# Patient Record
Sex: FEMALE | Race: WHITE | NOT HISPANIC OR LATINO | Employment: OTHER | URBAN - METROPOLITAN AREA
[De-identification: names, ages, dates, MRNs, and addresses within clinical notes are randomized per-mention and may not be internally consistent; named-entity substitution may affect disease eponyms.]

---

## 2017-05-11 ENCOUNTER — OFFICE VISIT (OUTPATIENT)
Dept: OPHTHALMOLOGY | Facility: CLINIC | Age: 82
End: 2017-05-11
Payer: MEDICARE

## 2017-05-11 DIAGNOSIS — Z96.1 LENS REPLACED BY OTHER MEANS: ICD-10-CM

## 2017-05-11 DIAGNOSIS — H04.129 DRY EYE: Primary | ICD-10-CM

## 2017-05-11 PROCEDURE — 99999 PR PBB SHADOW E&M-EST. PATIENT-LVL II: CPT | Mod: PBBFAC,,, | Performed by: OPHTHALMOLOGY

## 2017-05-11 PROCEDURE — 92014 COMPRE OPH EXAM EST PT 1/>: CPT | Mod: S$GLB,,, | Performed by: OPHTHALMOLOGY

## 2017-05-11 NOTE — PROGRESS NOTES
SUBJECTIVE:   Rebeka Pascual is a 88 y.o. female   Uncorrected distance visual acuity was 20/30 in the right eye and 20/30 -1 in the left eye.   Chief Complaint   Patient presents with    Eye Exam     2 year RTC, no drops        HPI:  HPI     Eye Exam    Additional comments: 2 year RTC, no drops           Comments   Pt states no problems with her vision since her last visit. Not using any   drops. No ocular pain or irritation. Using readers for small print. Still   seeing well at a distance with no correction.    1. PCIOL OD 4/10  2. PCIOL OS 3/10  3. Ptosis OU  4. Dry Eyes  5. Blepharitis  6. Mild Epiretinal Gliosis OS           Last edited by Jack Sawyer on 5/11/2017  8:52 AM. (History)        Assessment /Plan :  1. Dry eye Stable pt states much better   2. Lens replaced by other means Stable       RTC 1 Year

## 2017-08-29 ENCOUNTER — PATIENT OUTREACH (OUTPATIENT)
Dept: ADMINISTRATIVE | Facility: HOSPITAL | Age: 82
End: 2017-08-29

## 2018-01-02 ENCOUNTER — TELEPHONE (OUTPATIENT)
Dept: RHEUMATOLOGY | Facility: CLINIC | Age: 83
End: 2018-01-02

## 2018-01-02 NOTE — TELEPHONE ENCOUNTER
Spoke with patient.  She states she is having neck pain.  Advised her to get appt with ortho first and if needed, they would refer her to [facility]

## 2018-01-20 ENCOUNTER — HOSPITAL ENCOUNTER (EMERGENCY)
Facility: HOSPITAL | Age: 83
Discharge: HOME | End: 2018-01-20
Attending: EMERGENCY MEDICINE
Payer: MEDICARE

## 2018-01-20 VITALS
OXYGEN SATURATION: 97 % | RESPIRATION RATE: 18 BRPM | HEART RATE: 89 BPM | TEMPERATURE: 98 F | SYSTOLIC BLOOD PRESSURE: 115 MMHG | WEIGHT: 115 LBS | DIASTOLIC BLOOD PRESSURE: 56 MMHG | HEIGHT: 60 IN | BODY MASS INDEX: 22.58 KG/M2

## 2018-01-20 DIAGNOSIS — J06.9 UPPER RESPIRATORY TRACT INFECTION, UNSPECIFIED TYPE: Primary | ICD-10-CM

## 2018-01-20 DIAGNOSIS — R05.9 COUGH: ICD-10-CM

## 2018-01-20 DIAGNOSIS — S39.011A STRAIN OF ABDOMINAL MUSCLE, INITIAL ENCOUNTER: ICD-10-CM

## 2018-01-20 LAB
ALBUMIN SERPL BCP-MCNC: 3.5 G/DL
ALP SERPL-CCNC: 71 U/L
ALT SERPL W/O P-5'-P-CCNC: 17 U/L
ANION GAP SERPL CALC-SCNC: 11 MMOL/L
AST SERPL-CCNC: 19 U/L
BASOPHILS # BLD AUTO: 0.03 K/UL
BASOPHILS NFR BLD: 0.5 %
BILIRUB SERPL-MCNC: 0.6 MG/DL
BILIRUB UR QL STRIP: NEGATIVE
BUN SERPL-MCNC: 21 MG/DL
CALCIUM SERPL-MCNC: 8.7 MG/DL
CHLORIDE SERPL-SCNC: 106 MMOL/L
CLARITY UR: CLEAR
CO2 SERPL-SCNC: 22 MMOL/L
COLOR UR: YELLOW
CREAT SERPL-MCNC: 0.7 MG/DL
DIFFERENTIAL METHOD: ABNORMAL
EOSINOPHIL # BLD AUTO: 0.1 K/UL
EOSINOPHIL NFR BLD: 0.9 %
ERYTHROCYTE [DISTWIDTH] IN BLOOD BY AUTOMATED COUNT: 13.8 %
EST. GFR  (AFRICAN AMERICAN): >60 ML/MIN/1.73 M^2
EST. GFR  (NON AFRICAN AMERICAN): >60 ML/MIN/1.73 M^2
FLUAV AG SPEC QL IA: NEGATIVE
FLUBV AG SPEC QL IA: NEGATIVE
GLUCOSE SERPL-MCNC: 99 MG/DL
GLUCOSE UR QL STRIP: NEGATIVE
HCT VFR BLD AUTO: 35.3 %
HGB BLD-MCNC: 11.7 G/DL
HGB UR QL STRIP: ABNORMAL
KETONES UR QL STRIP: ABNORMAL
LEUKOCYTE ESTERASE UR QL STRIP: NEGATIVE
LIPASE SERPL-CCNC: 22 U/L
LYMPHOCYTES # BLD AUTO: 0.6 K/UL
LYMPHOCYTES NFR BLD: 9.2 %
MCH RBC QN AUTO: 29.8 PG
MCHC RBC AUTO-ENTMCNC: 33.1 G/DL
MCV RBC AUTO: 90 FL
MONOCYTES # BLD AUTO: 0.7 K/UL
MONOCYTES NFR BLD: 10.8 %
NEUTROPHILS # BLD AUTO: 5 K/UL
NEUTROPHILS NFR BLD: 78.6 %
NITRITE UR QL STRIP: NEGATIVE
PH UR STRIP: 6 [PH] (ref 5–8)
PLATELET # BLD AUTO: 228 K/UL
PMV BLD AUTO: 9.5 FL
POTASSIUM SERPL-SCNC: 3.6 MMOL/L
PROT SERPL-MCNC: 6.4 G/DL
PROT UR QL STRIP: ABNORMAL
RBC # BLD AUTO: 3.92 M/UL
SODIUM SERPL-SCNC: 139 MMOL/L
SP GR UR STRIP: >=1.03 (ref 1–1.03)
SPECIMEN SOURCE: NORMAL
URN SPEC COLLECT METH UR: ABNORMAL
UROBILINOGEN UR STRIP-ACNC: NEGATIVE EU/DL
WBC # BLD AUTO: 6.39 K/UL

## 2018-01-20 PROCEDURE — 81003 URINALYSIS AUTO W/O SCOPE: CPT

## 2018-01-20 PROCEDURE — 99284 EMERGENCY DEPT VISIT MOD MDM: CPT | Mod: 25

## 2018-01-20 PROCEDURE — 87400 INFLUENZA A/B EACH AG IA: CPT | Mod: 59

## 2018-01-20 PROCEDURE — 25000003 PHARM REV CODE 250: Performed by: EMERGENCY MEDICINE

## 2018-01-20 PROCEDURE — 63600175 PHARM REV CODE 636 W HCPCS: Performed by: EMERGENCY MEDICINE

## 2018-01-20 PROCEDURE — 85025 COMPLETE CBC W/AUTO DIFF WBC: CPT

## 2018-01-20 PROCEDURE — 80053 COMPREHEN METABOLIC PANEL: CPT

## 2018-01-20 PROCEDURE — 83690 ASSAY OF LIPASE: CPT

## 2018-01-20 PROCEDURE — 96374 THER/PROPH/DIAG INJ IV PUSH: CPT

## 2018-01-20 RX ORDER — NAPROXEN 500 MG/1
500 TABLET ORAL 2 TIMES DAILY WITH MEALS
Qty: 20 TABLET | Refills: 0 | Status: SHIPPED | OUTPATIENT
Start: 2018-01-20

## 2018-01-20 RX ORDER — BENZONATATE 100 MG/1
100 CAPSULE ORAL 3 TIMES DAILY PRN
Qty: 20 CAPSULE | Refills: 0 | Status: SHIPPED | OUTPATIENT
Start: 2018-01-20 | End: 2018-01-30

## 2018-01-20 RX ORDER — ONDANSETRON 2 MG/ML
4 INJECTION INTRAMUSCULAR; INTRAVENOUS
Status: COMPLETED | OUTPATIENT
Start: 2018-01-20 | End: 2018-01-20

## 2018-01-20 RX ORDER — ACETAMINOPHEN 500 MG
1000 TABLET ORAL
Status: COMPLETED | OUTPATIENT
Start: 2018-01-20 | End: 2018-01-20

## 2018-01-20 RX ADMIN — ONDANSETRON 4 MG: 2 INJECTION INTRAMUSCULAR; INTRAVENOUS at 03:01

## 2018-01-20 RX ADMIN — SODIUM CHLORIDE 1000 ML: 0.9 INJECTION, SOLUTION INTRAVENOUS at 03:01

## 2018-01-20 RX ADMIN — ACETAMINOPHEN 1000 MG: 500 TABLET ORAL at 03:01

## 2018-01-20 NOTE — ED PROVIDER NOTES
SCRIBE #1 NOTE: I, Pretty Steinberg, am scribing for, and in the presence of, Sung Conteh Jr., MD. I have scribed the entire note.      History      Chief Complaint   Patient presents with    Abdominal Pain     abdominal pain since midnight. Cough since yesterday       Review of patient's allergies indicates:  No Known Allergies     HPI   HPI    1/20/2018, 1:58 AM   History obtained from the patient      History of Present Illness: Rebeka Pascual is a 88 y.o. female patient who presents to the Emergency Department for lower abd pain which onset suddenly at midnight. Symptoms are constant and moderate in severity. No mitigating or exacerbating factors reported. Associated sxs include nausea, diarrhea, sore throat, and non-productive cough. Pt states her grand children are sick with flu-like sxs. Patient denies any fever, chills, constipation, hematochezia, emesis, dysuria, hematuria, frequency, CP, SOB, and all other sxs at this time. Prior Tx includes Tylenol at 2100 last PM. No further complaints or concerns at this time.       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    one only- not cancer    TONSILLECTOMY  1937         Family History:  Family History   Problem Relation Age of Onset    Cancer Brother 57     lung met to brain    Dementia  Mother     Diabetes Father     Asthma Son     Stroke Neg Hx     Heart disease Neg Hx        Social History:  Social History     Social History Main Topics    Smoking status: Former Smoker     Packs/day: 0.50     Years: 21.00     Quit date: 6/18/1968    Smokeless tobacco: Never Used    Alcohol use No    Drug use: No    Sexual activity: No       ROS   Review of Systems   Constitutional: Negative for chills and fever.   HENT: Positive for sore throat.    Respiratory: Positive for cough (non-productive). Negative for shortness of breath.    Cardiovascular: Negative for chest pain.   Gastrointestinal: Positive for abdominal pain (lower), diarrhea and nausea. Negative for blood in stool, constipation and vomiting.   Genitourinary: Negative for dysuria, frequency and hematuria.   Musculoskeletal: Negative for back pain.   Skin: Negative for rash.   Neurological: Negative for weakness.   Hematological: Does not bruise/bleed easily.   All other systems reviewed and are negative.    Physical Exam      Initial Vitals [01/20/18 0147]   BP Pulse Resp Temp SpO2   137/67 92 18 98.3 °F (36.8 °C) 97 %      MAP       90.33          Physical Exam  Nursing Notes and Vital Signs Reviewed.  Constitutional: Patient is in no acute distress. Well-developed and well-nourished.  Head: Atraumatic. Normocephalic.  Eyes: PERRL. EOM intact. Conjunctivae are not pale. No scleral icterus.  Ears: Right TM normal. Left TM normal. No erythema. No bulging. No effusion or air-fluid levels. No perforation.   Nose: Patent nares. Turbinates are normal. No drainage.   Throat: Moist mucous membranes. Posterior oropharynx is symmetric with erythema. Tonsillar exudate is not present. No trismus. Normal handling of secretions. No stridor.    Neck: Supple. Full ROM. No lymphadenopathy.  Cardiovascular: Regular rate. Regular rhythm. Heart murmur. No rubs or gallops. Distal pulses are 2+ and symmetric.  Pulmonary/Chest: No respiratory distress. Clear to  auscultation bilaterally. No wheezing or rales.  Abdominal: Soft and non-distended.  There is suprapubic tenderness.  No rebound, guarding, or rigidity. Good bowel sounds.  Genitourinary: No CVA tenderness  Musculoskeletal: Moves all extremities. No obvious deformities. No edema. No calf tenderness.  Skin: Warm and dry.  Neurological:  Alert, awake, and appropriate.  Normal speech.  No acute focal neurological deficits are appreciated.  Psychiatric: Normal affect. Good eye contact. Appropriate in content.    ED Course    Procedures  ED Vital Signs:  Vitals:    01/20/18 0147 01/20/18 0213 01/20/18 0214 01/20/18 0336   BP: 137/67 (!) 147/65     Pulse: 92  93 94   Resp: 18      Temp: 98.3 °F (36.8 °C)      TempSrc: Oral      SpO2: 97%  98%    Weight: 52.2 kg (115 lb)      Height: 5' (1.524 m)       01/20/18 0338 01/20/18 0400 01/20/18 0426 01/20/18 0506   BP:  (!) 122/59  (!) 115/56   Pulse:  91 93 89   Resp:  18  18   Temp: 98.8 °F (37.1 °C)   98.2 °F (36.8 °C)   TempSrc: Oral   Oral   SpO2:  97% 96% 97%   Weight:       Height:           Abnormal Lab Results:  Labs Reviewed   CBC W/ AUTO DIFFERENTIAL - Abnormal; Notable for the following:        Result Value    RBC 3.92 (*)     Hemoglobin 11.7 (*)     Hematocrit 35.3 (*)     Lymph # 0.6 (*)     Gran% 78.6 (*)     Lymph% 9.2 (*)     All other components within normal limits   COMPREHENSIVE METABOLIC PANEL - Abnormal; Notable for the following:     CO2 22 (*)     All other components within normal limits   URINALYSIS - Abnormal; Notable for the following:     Specific Gravity, UA >=1.030 (*)     Protein, UA Trace (*)     Ketones, UA 1+ (*)     Occult Blood UA Trace (*)     All other components within normal limits   LIPASE   INFLUENZA A AND B ANTIGEN        All Lab Results:  Results for orders placed or performed during the hospital encounter of 01/20/18   CBC W/ AUTO DIFFERENTIAL   Result Value Ref Range    WBC 6.39 3.90 - 12.70 K/uL    RBC 3.92 (L) 4.00 - 5.40 M/uL     Hemoglobin 11.7 (L) 12.0 - 16.0 g/dL    Hematocrit 35.3 (L) 37.0 - 48.5 %    MCV 90 82 - 98 fL    MCH 29.8 27.0 - 31.0 pg    MCHC 33.1 32.0 - 36.0 g/dL    RDW 13.8 11.5 - 14.5 %    Platelets 228 150 - 350 K/uL    MPV 9.5 9.2 - 12.9 fL    Gran # 5.0 1.8 - 7.7 K/uL    Lymph # 0.6 (L) 1.0 - 4.8 K/uL    Mono # 0.7 0.3 - 1.0 K/uL    Eos # 0.1 0.0 - 0.5 K/uL    Baso # 0.03 0.00 - 0.20 K/uL    Gran% 78.6 (H) 38.0 - 73.0 %    Lymph% 9.2 (L) 18.0 - 48.0 %    Mono% 10.8 4.0 - 15.0 %    Eosinophil% 0.9 0.0 - 8.0 %    Basophil% 0.5 0.0 - 1.9 %    Differential Method Automated    Comp. Metabolic Panel   Result Value Ref Range    Sodium 139 136 - 145 mmol/L    Potassium 3.6 3.5 - 5.1 mmol/L    Chloride 106 95 - 110 mmol/L    CO2 22 (L) 23 - 29 mmol/L    Glucose 99 70 - 110 mg/dL    BUN, Bld 21 8 - 23 mg/dL    Creatinine 0.7 0.5 - 1.4 mg/dL    Calcium 8.7 8.7 - 10.5 mg/dL    Total Protein 6.4 6.0 - 8.4 g/dL    Albumin 3.5 3.5 - 5.2 g/dL    Total Bilirubin 0.6 0.1 - 1.0 mg/dL    Alkaline Phosphatase 71 55 - 135 U/L    AST 19 10 - 40 U/L    ALT 17 10 - 44 U/L    Anion Gap 11 8 - 16 mmol/L    eGFR if African American >60 >60 mL/min/1.73 m^2    eGFR if non African American >60 >60 mL/min/1.73 m^2   Lipase   Result Value Ref Range    Lipase 22 4 - 60 U/L   Influenza antigen Nasopharyngeal Swab   Result Value Ref Range    Influenza A Ag, EIA Negative Negative    Influenza B Ag, EIA Negative Negative    Flu A & B Source Nasopharyngeal Swab    Urinalysis Catheterized   Result Value Ref Range    Specimen UA Urine, Catheterized     Color, UA Yellow Yellow, Straw, Lizzy    Appearance, UA Clear Clear    pH, UA 6.0 5.0 - 8.0    Specific Gravity, UA >=1.030 (A) 1.005 - 1.030    Protein, UA Trace (A) Negative    Glucose, UA Negative Negative    Ketones, UA 1+ (A) Negative    Bilirubin (UA) Negative Negative    Occult Blood UA Trace (A) Negative    Nitrite, UA Negative Negative    Urobilinogen, UA Negative <2.0 EU/dL    Leukocytes, UA Negative  Negative       Imaging Results:  Imaging Results          X-Ray Chest 1 View (Final result)  Result time 01/20/18 07:34:34    Final result by GABY Persaud Sr., MD (01/20/18 07:34:34)                 Impression:        1. There is a subtle mild haziness in the lateral aspect of the midportion of the right lung. This is consistent with the patient's history and characteristic of a subtle pneumonia.  2. There is a mild amount of levoconvex curvature of the thoracolumbar spine.        Electronically signed by: GABY PERSAUD MD  Date:     01/20/18  Time:    07:34              Narrative:    One-view chest x-ray    Clinical History: Cough    Finding: The size of the heart is normal. There is a subtle mild haziness in the lateral aspect of the midportion of the right lung. The left lung is clear. There is no pneumothorax.  The costophrenic angles are sharp. There is a mild amount of levoconvex curvature of the thoracolumbar spine.                             X-Ray Abdomen Flat And Erect (Final result)  Result time 01/20/18 07:33:21    Final result by GABY Persaud Sr., MD (01/20/18 07:33:21)                 Impression:      1. The bowel gas pattern is normal in appearance.   2. There is a mild amount of levoconvex curvature of the thoracolumbar spine.   3. There are mild degenerative changes in the lumbar spine.      Electronically signed by: GABY PERSAUD MD  Date:     01/20/18  Time:    07:33              Narrative:    Flat and erect KUB    History: Abdominal pain    Finding: The bowel gas pattern is normal in appearance. There is no pneumoperitoneum. There is a mild amount of levoconvex curvature of the thoracolumbar spine. There are mild degenerative changes in the lumbar spine.                                      The Emergency Provider reviewed the vital signs and test results, which are outlined above.    ED Discussion     4:52 AM: Reassessed pt at this time.  Pt states her condition has improved at this time.  Discussed with pt all pertinent ED information and results. Discussed pt dx and plan of tx. Gave pt all f/u and return to the ED instructions. All questions and concerns were addressed at this time. Pt expresses understanding of information and instructions, and is comfortable with plan to discharge. Pt is stable for discharge.    I discussed with patient and/or family/caretaker that evaluation in the ED does not suggest any emergent or life threatening medical conditions requiring immediate intervention beyond what was provided in the ED, and I believe patient is safe for discharge.  Regardless, an unremarkable evaluation in the ED does not preclude the development or presence of a serious of life threatening condition. As such, patient was instructed to return immediately for any worsening or change in current symptoms.    Regarding UPPER RESPIRATORY ILLNESS, for treatment, I encouraged patient to: drink plenty of fluids; get lots of rest; take medications as prescribed; use over-the-counter medications for symptomatic treatment;  and use a humidifier or steam in the bathroom to improve patency of upper airway.  Patient instructed to notify primary care provider, or return to the emergency department, if the they: have a cough most days or have a cough that returns frequently; begin coughing up blood; develop a high fever or shaking chills; have a low-grade fever for three or more days; develop thick, greenish mucus, especially if it has a bad smell; and experience shortness of breath or chest pain. For prevention, discussed with patient the importance of refraining from smoking (if applicable), getting annual influenza vaccines, reducing exposure to air pollution, and the need to frequently wash hands to avoid spread of infection.         ED Medication(s):  Medications   sodium chloride 0.9% bolus 1,000 mL (1,000 mLs Intravenous New Bag 1/20/18 3330)   ondansetron injection 4 mg (4 mg Intravenous Given 1/20/18  0309)   acetaminophen tablet 1,000 mg (1,000 mg Oral Given 1/20/18 0309)       Discharge Medication List as of 1/20/2018  4:50 AM      START taking these medications    Details   benzonatate (TESSALON) 100 MG capsule Take 1 capsule (100 mg total) by mouth 3 (three) times daily as needed for Cough., Starting Sat 1/20/2018, Until Tue 1/30/2018, Print      naproxen (NAPROSYN) 500 MG tablet Take 1 tablet (500 mg total) by mouth 2 (two) times daily with meals., Starting Sat 1/20/2018, Print             Follow-up Information     [facility]. Schedule an appointment as soon as possible for a visit in 1 week.    Specialty:  Internal Medicine  Contact information:  [address] [address] [phone]  Additional information:  [facility] 1st [facility]           [facility]. Schedule an appointment as soon as possible for a visit in 1 week.    Contact information:  [address] [facility] [address] [phone]             [facility] - .    Specialty:  Emergency Medicine  Why:  As needed, If symptoms worsen  Contact information:  [address] [facility] [address] [phone]                   Medical Decision Making    Medical Decision Making:   Clinical Tests:   Lab Tests: Ordered and Reviewed  Radiological Study: Reviewed and Ordered           Scribe Attestation:   Scribe #1: I performed the above scribed service and the documentation accurately describes the services I performed. I attest to the accuracy of the note.    Attending:   Physician Attestation Statement for Scribe #1: I, Sung Conteh Jr., MD, personally performed the services described in this documentation, as scribed by Pretty Steinberg, in my presence, and it is both accurate and complete.          Clinical Impression       ICD-10-CM ICD-9-CM   1. Upper respiratory tract infection, unspecified type J06.9 465.9   2. Cough R05 786.2   3. Strain of  abdominal muscle, initial encounter S39.011A 848.8       Disposition:   Disposition: Discharged  Condition: Stable         Sung Conteh Jr., MD  01/24/18 0218

## 2018-01-20 NOTE — DISCHARGE INSTRUCTIONS
Regarding UPPER RESPIRATORY ILLNESS, for treatment, I encouraged patient to: drink plenty of fluids; get lots of rest; take medications as prescribed; use over-the-counter medications for symptomatic treatment;  and use a humidifier or steam in the bathroom to improve patency of upper airway.  Patient instructed to notify primary care provider, or return to the emergency department, if the they: have a cough most days or have a cough that returns frequently; begin coughing up blood; develop a high fever or shaking chills; have a low-grade fever for three or more days; develop thick, greenish mucus, especially if it has a bad smell; and experience shortness of breath or chest pain. For prevention, discussed with patient the importance of refraining from smoking (if applicable), getting annual influenza vaccines, reducing exposure to air pollution, and the need to frequently wash hands to avoid spread of infection.     Regarding ABDOMINAL MUSCLE STRAIN, I advised patient to: REST the injured area or use it less than usual; apply ICE to decrease swelling and pain and help prevent tissue damage; use COMPRESSION with an elastic bandage to support the area and decrease swelling; ELEVATE injured body part above the level of the heart to help decrease pain and swelling; AVOID using massage or massage to acute injuries as it may slow healing of the area; AVOID drinking alcohol as it may slow healing of the injury; and avoid stretching injured muscles. Advised patient to return to the emergency department or contact primary care provider if: having trouble moving injured area; notice tingling or numbness in or near the injured area; extremity below the bruise gets cold or turns pale; a new lump develops in the injured area; symptoms do not improve with treatment after 4 to 5 days; there is any questions or concerns about the condition or treatment plan.     Rest  Drink plenty of clear fluids   Nasal saline spray to clear  nasal drainage and help with nasal congestion  Zyrtec or Claritin to help dry mucus and post nasal drip  Mucinex or Mucinex DM for cough and chest congestion  Tylenol or Ibuprofen for fever, headache and body aches  Warm salt water gargles for throat comfort  Chloraseptic spray or lozenges for throat comfort  RTC with no improvement or worsening

## 2019-11-19 ENCOUNTER — OFFICE VISIT (OUTPATIENT)
Dept: OPHTHALMOLOGY | Facility: CLINIC | Age: 84
End: 2019-11-19
Payer: MEDICARE

## 2019-11-19 DIAGNOSIS — H04.129 DRY EYE: ICD-10-CM

## 2019-11-19 DIAGNOSIS — Z96.1 PSEUDOPHAKIA OF BOTH EYES: Primary | ICD-10-CM

## 2019-11-19 PROCEDURE — 99999 PR PBB SHADOW E&M-EST. PATIENT-LVL II: CPT | Mod: PBBFAC,,, | Performed by: OPHTHALMOLOGY

## 2019-11-19 PROCEDURE — 92014 PR EYE EXAM, EST PATIENT,COMPREHESV: ICD-10-PCS | Mod: S$GLB,,, | Performed by: OPHTHALMOLOGY

## 2019-11-19 PROCEDURE — 92014 COMPRE OPH EXAM EST PT 1/>: CPT | Mod: S$GLB,,, | Performed by: OPHTHALMOLOGY

## 2019-11-19 PROCEDURE — 99999 PR PBB SHADOW E&M-EST. PATIENT-LVL II: ICD-10-PCS | Mod: PBBFAC,,, | Performed by: OPHTHALMOLOGY

## 2019-11-19 NOTE — PROGRESS NOTES
SUBJECTIVE:   Rebeka Pascual is a 89 y.o. female   Uncorrected distance visual acuity was 20/30 -1 in the right eye and 20/30 -2 in the left eye.   Chief Complaint   Patient presents with    Annual Exam    Blurred Vision        HPI:  HPI     Pt here for annual exam. Pt states that for about 4-5 days, she   experienced some blurred vision OD, but that cleared up. She says she   experienced the same in her left eye, but it only last a couple of   minutes.     1. PCIOL OD 4/10  2. PCIOL OS 3/10  3. Ptosis OU  4. Dry Eyes  5. Blepharitis  6. Mild Epiretinal Gliosis OS    Last edited by Mick Sumner, Patient Care Assistant on 11/19/2019  8:29   AM. (History)        Assessment /Plan :  1. Pseudophakia of both eyes  -- Condition stable, no therapeutic change required. Monitoring routinely.   2. Dry eye recommend artificial tears prn OU     RTC in 1 year or prn any changes

## 2020-11-30 ENCOUNTER — TELEPHONE (OUTPATIENT)
Dept: OBSTETRICS AND GYNECOLOGY | Facility: CLINIC | Age: 85
End: 2020-11-30

## 2020-11-30 NOTE — TELEPHONE ENCOUNTER
----- Message from Genevieve Jean sent at 11/30/2020  8:32 AM CST -----  Please cancel pt appt tomorrow, pt no coming, any call pt @ [phone].